# Patient Record
Sex: FEMALE | Race: ASIAN | NOT HISPANIC OR LATINO | URBAN - METROPOLITAN AREA
[De-identification: names, ages, dates, MRNs, and addresses within clinical notes are randomized per-mention and may not be internally consistent; named-entity substitution may affect disease eponyms.]

---

## 2019-10-22 ENCOUNTER — OFFICE VISIT - HEALTHEAST (OUTPATIENT)
Dept: FAMILY MEDICINE | Facility: CLINIC | Age: 34
End: 2019-10-22

## 2019-10-22 DIAGNOSIS — H10.33 ACUTE BACTERIAL CONJUNCTIVITIS OF BOTH EYES: ICD-10-CM

## 2021-06-02 NOTE — PATIENT INSTRUCTIONS - HE
1) warm compress with a clean wet washcloth to affected eye.  2) Administer 1 drop to the affected eye four times a day. Antihistamine drop as needed for irritation.    3) Practice good hand hygiene.  4) Avoid wearing contacts while you are having treatment.   5) Follow up if symptoms worsen or if not getting better within 3 days.

## 2021-06-02 NOTE — PROGRESS NOTES
Chief Complaint   Patient presents with     Eye Problem     L eye pains, watering, swelling and redness x 4 days       HPI:  Sarath Berkowitz is a 34 y.o. female who presents today complaining of eye concern x 4 days.  For the past 4 days patient has had left eye watering, swelling of the conjunctiva, and redness of the normally white portion of the eye.  She denies any significant visual changes.  She typically wears contacts.  She denies any known injury to the eye or possibilities for getting something in the eye.  She does not have severe eye pain, but there is an irritation like something is stuck in her eye.  She has not used any over-the-counter medications her symptoms.  She denies any photophobia.    History obtained from the patient.    Problem List:  There are no relevant problems documented for this patient.      No past medical history on file.    Social History     Tobacco Use     Smoking status: Never Smoker     Smokeless tobacco: Never Used   Substance Use Topics     Alcohol use: Not on file       Review of Systems   Eyes: Positive for pain (Mild), discharge (Watery), redness and itching. Negative for photophobia and visual disturbance.       Vitals:    10/22/19 0900   BP: 122/82   Pulse: 65   Resp: 12   Temp: 98  F (36.7  C)   TempSrc: Oral   SpO2: 98%   Weight: 178 lb (80.7 kg)       Physical Exam  Constitutional:       General: She is not in acute distress.     Appearance: She is well-developed. She is not diaphoretic.   HENT:      Head: Normocephalic and atraumatic.      Right Ear: External ear normal.      Left Ear: External ear normal.   Eyes:      General: Lids are normal.         Right eye: No discharge.         Left eye: No discharge.      Extraocular Movements: Extraocular movements intact.      Conjunctiva/sclera:      Right eye: Right conjunctiva is not injected.      Left eye: Left conjunctiva is injected. No hemorrhage.     Pupils: Pupils are equal, round, and reactive to light.      Comments:  Left eye conjunctiva is swollen, injected, and there is bilateral eye watering.   Pulmonary:      Effort: Pulmonary effort is normal. No respiratory distress.   Psychiatric:         Behavior: Behavior normal.         Thought Content: Thought content normal.         Judgement: Judgment normal.         Clinical Decision Making:  Conjunctiva is injected and irritated.  Patient denies any risk for foreign body or corneal abrasion.  Patient was started on ofloxacin which was chosen due to her history of contact usage.  She is also started on Ketotifen for irritation and due to the conjunctival swelling.  Considered possibilities such as glaucoma, corneal abrasion, or iritis, however there is no significant photophobia or eye pain.  At the end of the encounter, I discussed results, diagnosis, medications. Discussed red flags for immediate return to clinic/ER, as well as indications for follow up if no improvement. Patient understood and agreed to plan. Patient was stable for discharge.    1. Acute bacterial conjunctivitis of both eyes  ofloxacin (OCUFLOX) 0.3 % ophthalmic solution    ketotifen (ZADITOR/ZYRTEC ITCHY EYES) 0.025 % (0.035 %) ophthalmic solution         Patient Instructions   1) warm compress with a clean wet washcloth to affected eye.  2) Administer 1 drop to the affected eye four times a day. Antihistamine drop as needed for irritation.    3) Practice good hand hygiene.  4) Avoid wearing contacts while you are having treatment.   5) Follow up if symptoms worsen or if not getting better within 3 days.

## 2021-06-03 VITALS
RESPIRATION RATE: 12 BRPM | WEIGHT: 178 LBS | TEMPERATURE: 98 F | DIASTOLIC BLOOD PRESSURE: 82 MMHG | OXYGEN SATURATION: 98 % | SYSTOLIC BLOOD PRESSURE: 122 MMHG | HEART RATE: 65 BPM